# Patient Record
Sex: MALE | Race: WHITE | URBAN - METROPOLITAN AREA
[De-identification: names, ages, dates, MRNs, and addresses within clinical notes are randomized per-mention and may not be internally consistent; named-entity substitution may affect disease eponyms.]

---

## 2017-04-21 ENCOUNTER — TELEPHONE (OUTPATIENT)
Dept: FAMILY MEDICINE | Facility: CLINIC | Age: 57
End: 2017-04-21

## 2017-04-21 NOTE — TELEPHONE ENCOUNTER
Patient needs orders to do repeat semen analysis post vasectomy.  Please place orders.  Appointment scheduled Monday 4/24 - patient will  cup today.    Ciera Archer

## 2017-04-24 DIAGNOSIS — Z98.52 S/P VASECTOMY: Primary | ICD-10-CM

## 2017-04-24 LAB — SEMEN ANALYSIS P VAS PNL: NORMAL

## 2017-04-24 PROCEDURE — 89321 SEMEN ANAL SPERM DETECTION: CPT | Performed by: FAMILY MEDICINE

## 2017-04-24 NOTE — TELEPHONE ENCOUNTER
Will close encounter patient has had lab appointment and orders were placed. Garima Polanco R.N.

## 2017-04-24 NOTE — PROGRESS NOTES
Please send the following letter:    Mr. Fuentes,    -All of your labs are normal.  You're good to go!    If you have further questions about the interpretation of your labs, [email or web address] is a good website to check out for further information.      Please contact the clinic if you have additional questions.  Thank you.    Sincerely,    Guillermo York MD

## 2017-04-24 NOTE — LETTER
[facility]     [address] [place] [address]                                           [phone]   April 24, 2017    Power Fuentes  [id number] [place] [address]      Dear Power,    Here is a summary of your recent test results:    All of your labs are normal.  You're good to go!     Your test results are enclosed.      Thank you for choosing [facility].  We appreciate the opportunity to serve you and look forward to supporting your healthcare needs in the future.    If you have any questions or concerns, please call me or my staff at [phone].    Best regards,  Guillermo York Jr, MD       Results for orders placed or performed in visit on 04/24/17   Semen Analysis Post Vasectomy   Result Value Ref Range    Post Vas Analysis No Sperm Seen NOSPRM